# Patient Record
Sex: MALE | Race: WHITE | NOT HISPANIC OR LATINO | Employment: OTHER | URBAN - METROPOLITAN AREA
[De-identification: names, ages, dates, MRNs, and addresses within clinical notes are randomized per-mention and may not be internally consistent; named-entity substitution may affect disease eponyms.]

---

## 2020-10-01 ENCOUNTER — OFFICE VISIT (OUTPATIENT)
Dept: URBAN - METROPOLITAN AREA CLINIC 68 | Facility: CLINIC | Age: 75
End: 2020-10-01

## 2021-03-16 ENCOUNTER — OFFICE VISIT (OUTPATIENT)
Dept: URBAN - METROPOLITAN AREA CLINIC 68 | Facility: CLINIC | Age: 76
End: 2021-03-16

## 2021-04-14 ENCOUNTER — OFFICE VISIT (OUTPATIENT)
Dept: URBAN - METROPOLITAN AREA SURGERY CENTER 12 | Facility: SURGERY CENTER | Age: 76
End: 2021-04-14

## 2021-04-15 ENCOUNTER — LAB OUTSIDE AN ENCOUNTER (OUTPATIENT)
Dept: URBAN - METROPOLITAN AREA CLINIC 68 | Facility: CLINIC | Age: 76
End: 2021-04-15

## 2021-04-15 LAB — 01: (no result)

## 2021-04-19 ENCOUNTER — TELEPHONE ENCOUNTER (OUTPATIENT)
Dept: URBAN - METROPOLITAN AREA CLINIC 68 | Facility: CLINIC | Age: 76
End: 2021-04-19

## 2021-04-27 ENCOUNTER — OFFICE VISIT (OUTPATIENT)
Dept: URBAN - METROPOLITAN AREA CLINIC 68 | Facility: CLINIC | Age: 76
End: 2021-04-27

## 2022-06-04 ENCOUNTER — TELEPHONE ENCOUNTER (OUTPATIENT)
Dept: URBAN - METROPOLITAN AREA CLINIC 68 | Facility: CLINIC | Age: 77
End: 2022-06-04

## 2022-06-04 RX ORDER — PANTOPRAZOLE SODIUM 40 MG/1
TABLET, DELAYED RELEASE ORAL DAILY
Qty: 90 | Refills: 0 | OUTPATIENT
Start: 2018-01-23 | End: 2018-04-18

## 2022-06-04 RX ORDER — SODIUM SULFATE, POTASSIUM SULFATE, MAGNESIUM SULFATE 17.5; 3.13; 1.6 G/ML; G/ML; G/ML
SOLUTION, CONCENTRATE ORAL AS DIRECTED
Qty: 1 | Refills: 0 | OUTPATIENT
Start: 2021-03-16 | End: 2021-03-17

## 2022-06-04 RX ORDER — NEBIVOLOL HYDROCHLORIDE 10 MG/1
BYSTOLIC( 10MG ORAL  DAILY ) INACTIVE -HX ENTRY TABLET ORAL DAILY
OUTPATIENT
Start: 2018-01-23

## 2022-06-04 RX ORDER — AMPICILLIN SODIUM 2 G/1
INJECTION, POWDER, FOR SOLUTION INTRAMUSCULAR; INTRAVENOUS AS DIRECTED
Qty: 2 | Refills: 0 | OUTPATIENT
Start: 2014-01-07 | End: 2018-01-23

## 2022-06-05 ENCOUNTER — TELEPHONE ENCOUNTER (OUTPATIENT)
Dept: URBAN - METROPOLITAN AREA CLINIC 68 | Facility: CLINIC | Age: 77
End: 2022-06-05

## 2022-06-05 RX ORDER — PRAVASTATIN SODIUM 20 MG/1
PRAVASTATIN SODIUM( 20MG ORAL  DAILY ) ACTIVE -HX ENTRY TABLET ORAL DAILY
Status: ACTIVE | COMMUNITY
Start: 2021-04-27

## 2022-06-05 RX ORDER — LOSARTAN POTASSIUM 25 MG/1
LOSARTAN POTASSIUM( 25MG ORAL 1 DAILY ) ACTIVE -HX ENTRY TABLET ORAL DAILY
Status: ACTIVE | COMMUNITY
Start: 2021-04-27

## 2022-06-05 RX ORDER — ASCORBIC ACID 500 MG
VITAMIN C ER( 500MG ORAL  DDILY ) ACTIVE -HX ENTRY CAPSULE, EXTENDED RELEASE ORAL
Status: ACTIVE | COMMUNITY
Start: 2021-04-27

## 2022-06-05 RX ORDER — UBIDECARENONE/VIT E ACET 100MG-5
VITAMIN D( 2000UNIT ORAL  DAILY ) ACTIVE -HX ENTRY CAPSULE ORAL DAILY
Status: ACTIVE | COMMUNITY
Start: 2021-04-27

## 2022-06-05 RX ORDER — ELECTROLYTES/DEXTROSE
MULTIVITAMIN ADULT(  ORAL 1 DAILY ) ACTIVE -HX ENTRY SOLUTION, ORAL ORAL DAILY
Status: ACTIVE | COMMUNITY
Start: 2021-04-27

## 2022-06-05 RX ORDER — IPRATROPIUM BROMIDE 21 MCG
IPRATROPIUM BROMIDE( 0.03% NASAL  AS DIRECTED ) ACTIVE -HX ENTRY AEROSOL, SPRAY (ML) NASAL AS DIRECTED
Status: ACTIVE | COMMUNITY
Start: 2021-04-27

## 2022-06-25 ENCOUNTER — TELEPHONE ENCOUNTER (OUTPATIENT)
Age: 77
End: 2022-06-25

## 2022-06-25 RX ORDER — SODIUM SULFATE, POTASSIUM SULFATE, MAGNESIUM SULFATE 17.5; 3.13; 1.6 G/ML; G/ML; G/ML
SOLUTION, CONCENTRATE ORAL AS DIRECTED
Qty: 1 | Refills: 0 | OUTPATIENT
Start: 2021-03-16 | End: 2021-03-17

## 2022-06-25 RX ORDER — AMPICILLIN 2 G/1
INJECTION, POWDER, FOR SOLUTION INTRAMUSCULAR; INTRAVENOUS AS DIRECTED
Qty: 2 | Refills: 0 | OUTPATIENT
Start: 2014-01-07 | End: 2018-01-23

## 2022-06-25 RX ORDER — PANTOPRAZOLE 40 MG/1
TABLET, DELAYED RELEASE ORAL DAILY
Qty: 90 | Refills: 0 | OUTPATIENT
Start: 2018-01-23 | End: 2018-04-18

## 2022-06-25 RX ORDER — NEBIVOLOL HYDROCHLORIDE 10 MG/1
BYSTOLIC( 10MG ORAL  DAILY ) INACTIVE -HX ENTRY TABLET ORAL DAILY
OUTPATIENT
Start: 2018-01-23

## 2022-06-26 ENCOUNTER — TELEPHONE ENCOUNTER (OUTPATIENT)
Age: 77
End: 2022-06-26

## 2022-06-26 RX ORDER — UBIDECARENONE/VIT E ACET 100MG-5
VITAMIN D( 2000UNIT ORAL  DAILY ) ACTIVE -HX ENTRY CAPSULE ORAL DAILY
Status: ACTIVE | COMMUNITY
Start: 2021-04-27

## 2022-06-26 RX ORDER — ASPIRIN 81 MG/1
BABY ASPIRIN( 81MG ORAL  DAILY ) ACTIVE -HX ENTRY TABLET, COATED ORAL DAILY
Status: ACTIVE | COMMUNITY
Start: 2021-04-27

## 2022-06-26 RX ORDER — BECLOMETHASONE DIPROPIONATE HFA 80 UG/1
QVAR( 80MCG/ACT INHALATION  AS DIRECTED ) ACTIVE -HX ENTRY AEROSOL, METERED RESPIRATORY (INHALATION) AS DIRECTED
Status: ACTIVE | COMMUNITY
Start: 2021-04-27

## 2022-06-26 RX ORDER — LOSARTAN POTASSIUM 25 MG/1
LOSARTAN POTASSIUM( 25MG ORAL 1 DAILY ) ACTIVE -HX ENTRY TABLET, FILM COATED ORAL DAILY
Status: ACTIVE | COMMUNITY
Start: 2021-04-27

## 2022-06-26 RX ORDER — ASPIRIN 81 MG/1
LOW-DOSE ASPIRIN( 81MG ORAL  DAILY ) ACTIVE -HX ENTRY TABLET, COATED ORAL DAILY
Status: ACTIVE | COMMUNITY
Start: 2021-04-27

## 2022-06-26 RX ORDER — PRAVASTATIN SODIUM 20 MG/1
PRAVASTATIN SODIUM( 20MG ORAL  DAILY ) ACTIVE -HX ENTRY TABLET ORAL DAILY
Status: ACTIVE | COMMUNITY
Start: 2021-04-27

## 2022-06-26 RX ORDER — ASCORBIC ACID 500 MG
VITAMIN C ER( 500MG ORAL  DDILY ) ACTIVE -HX ENTRY CAPSULE, EXTENDED RELEASE ORAL
Status: ACTIVE | COMMUNITY
Start: 2021-04-27

## 2022-06-26 RX ORDER — ELECTROLYTES/DEXTROSE
MULTIVITAMIN ADULT(  ORAL 1 DAILY ) ACTIVE -HX ENTRY SOLUTION, ORAL ORAL DAILY
Status: ACTIVE | COMMUNITY
Start: 2021-04-27

## 2022-06-26 RX ORDER — IPRATROPIUM BROMIDE 21 UG/1
IPRATROPIUM BROMIDE( 0.03% NASAL  AS DIRECTED ) ACTIVE -HX ENTRY SPRAY NASAL AS DIRECTED
Status: ACTIVE | COMMUNITY
Start: 2021-04-27

## 2023-04-19 ENCOUNTER — OFFICE VISIT (OUTPATIENT)
Dept: URBAN - METROPOLITAN AREA CLINIC 68 | Facility: CLINIC | Age: 78
End: 2023-04-19

## 2023-04-19 RX ORDER — IPRATROPIUM BROMIDE 21 UG/1
IPRATROPIUM BROMIDE( 0.03% NASAL  AS DIRECTED ) ACTIVE -HX ENTRY SPRAY NASAL AS DIRECTED
Status: ACTIVE | COMMUNITY
Start: 2021-04-27

## 2023-04-19 RX ORDER — UBIDECARENONE/VIT E ACET 100MG-5
VITAMIN D( 2000UNIT ORAL  DAILY ) ACTIVE -HX ENTRY CAPSULE ORAL DAILY
Status: ACTIVE | COMMUNITY
Start: 2021-04-27

## 2023-04-19 RX ORDER — ASPIRIN 81 MG/1
BABY ASPIRIN( 81MG ORAL  DAILY ) ACTIVE -HX ENTRY TABLET, COATED ORAL DAILY
Status: ON HOLD | COMMUNITY
Start: 2021-04-27

## 2023-04-19 RX ORDER — BECLOMETHASONE DIPROPIONATE HFA 80 UG/1
QVAR( 80MCG/ACT INHALATION  AS DIRECTED ) ACTIVE -HX ENTRY AEROSOL, METERED RESPIRATORY (INHALATION) AS DIRECTED
Status: ACTIVE | COMMUNITY
Start: 2021-04-27

## 2023-04-19 RX ORDER — ASCORBIC ACID 500 MG
VITAMIN C ER( 500MG ORAL  DDILY ) ACTIVE -HX ENTRY CAPSULE, EXTENDED RELEASE ORAL
Status: ACTIVE | COMMUNITY
Start: 2021-04-27

## 2023-04-19 RX ORDER — LOSARTAN POTASSIUM 25 MG/1
LOSARTAN POTASSIUM( 25MG ORAL 1 DAILY ) ACTIVE -HX ENTRY TABLET, FILM COATED ORAL DAILY
Status: ACTIVE | COMMUNITY
Start: 2021-04-27

## 2023-04-19 RX ORDER — PRAVASTATIN SODIUM 20 MG/1
PRAVASTATIN SODIUM( 20MG ORAL  DAILY ) ACTIVE -HX ENTRY TABLET ORAL DAILY
Status: ACTIVE | COMMUNITY
Start: 2021-04-27

## 2023-04-19 RX ORDER — APIXABAN 5 MG/1
1 TABLET TABLET, FILM COATED ORAL TWICE A DAY
Status: ACTIVE | COMMUNITY

## 2023-04-19 NOTE — HPI-MIGRATED HPI
General : Patient evaluated due to colon polyps.  Referred is pending knee surgery that is scheduled for May 2023.  Denies nausea, vomits, dysphagia, odynophagia, heartburn, abdominal pain, diarrhea, constipation GI bleeding or weight loss

## 2023-11-01 ENCOUNTER — OFFICE VISIT (OUTPATIENT)
Dept: URBAN - METROPOLITAN AREA CLINIC 68 | Facility: CLINIC | Age: 78
End: 2023-11-01
Payer: MEDICARE

## 2023-11-01 VITALS
BODY MASS INDEX: 25.77 KG/M2 | WEIGHT: 180 LBS | DIASTOLIC BLOOD PRESSURE: 80 MMHG | SYSTOLIC BLOOD PRESSURE: 136 MMHG | HEIGHT: 70 IN

## 2023-11-01 DIAGNOSIS — K57.30 DIVERTICULOSIS OF COLON: ICD-10-CM

## 2023-11-01 DIAGNOSIS — Z86.010 HISTORY OF COLON POLYPS: ICD-10-CM

## 2023-11-01 DIAGNOSIS — K29.50 CHRONIC GASTRITIS WITHOUT BLEEDING, UNSPECIFIED GASTRITIS TYPE: ICD-10-CM

## 2023-11-01 DIAGNOSIS — R12 HEARTBURN: ICD-10-CM

## 2023-11-01 DIAGNOSIS — Z80.0 FAMILY HISTORY OF COLON CANCER: ICD-10-CM

## 2023-11-01 PROCEDURE — 99213 OFFICE O/P EST LOW 20 MIN: CPT | Performed by: INTERNAL MEDICINE

## 2023-11-01 RX ORDER — ASCORBIC ACID 500 MG
VITAMIN C ER( 500MG ORAL  DDILY ) ACTIVE -HX ENTRY CAPSULE, EXTENDED RELEASE ORAL
Status: ACTIVE | COMMUNITY
Start: 2021-04-27

## 2023-11-01 RX ORDER — PRAVASTATIN SODIUM 20 MG/1
PRAVASTATIN SODIUM( 20MG ORAL  DAILY ) ACTIVE -HX ENTRY TABLET ORAL DAILY
Status: ACTIVE | COMMUNITY
Start: 2021-04-27

## 2023-11-01 RX ORDER — IPRATROPIUM BROMIDE 21 UG/1
IPRATROPIUM BROMIDE( 0.03% NASAL  AS DIRECTED ) ACTIVE -HX ENTRY SPRAY NASAL AS DIRECTED
Status: ACTIVE | COMMUNITY
Start: 2021-04-27

## 2023-11-01 RX ORDER — SOD SULF/POT CHLORIDE/MAG SULF 1.479 G
12 TABLETS TABLET ORAL
Qty: 24 | Refills: 0 | OUTPATIENT
Start: 2023-11-01 | End: 2023-11-02

## 2023-11-01 RX ORDER — UBIDECARENONE/VIT E ACET 100MG-5
VITAMIN D( 2000UNIT ORAL  DAILY ) ACTIVE -HX ENTRY CAPSULE ORAL DAILY
Status: ACTIVE | COMMUNITY
Start: 2021-04-27

## 2023-11-01 RX ORDER — BECLOMETHASONE DIPROPIONATE HFA 80 UG/1
QVAR( 80MCG/ACT INHALATION  AS DIRECTED ) ACTIVE -HX ENTRY AEROSOL, METERED RESPIRATORY (INHALATION) AS DIRECTED
Status: ACTIVE | COMMUNITY
Start: 2021-04-27

## 2023-11-01 RX ORDER — LOSARTAN POTASSIUM 25 MG/1
LOSARTAN POTASSIUM( 25MG ORAL 1 DAILY ) ACTIVE -HX ENTRY TABLET, FILM COATED ORAL DAILY
Status: ACTIVE | COMMUNITY
Start: 2021-04-27

## 2023-11-01 RX ORDER — APIXABAN 5 MG/1
1 TABLET TABLET, FILM COATED ORAL TWICE A DAY
Status: ACTIVE | COMMUNITY

## 2023-11-01 RX ORDER — ASPIRIN 81 MG/1
BABY ASPIRIN( 81MG ORAL  DAILY ) ACTIVE -HX ENTRY TABLET, COATED ORAL DAILY
Status: ON HOLD | COMMUNITY
Start: 2021-04-27

## 2023-11-01 NOTE — HPI-TODAY'S VISIT:
Patient evluated due to colon polyps. Denies nausea, vomits, dysphagia, odynophagia, heartburn, abdominal pain, diarrhea, constipation GI bleeding or weight loss

## 2023-11-01 NOTE — PHYSICAL EXAM HENT:
Head, normocephalic, atraumatic, Face, Face within normal limits, Ears, External ears within normal limits, Nose/Nasopharynx, External nose normal appearance, nares patent, no nasal discharge, Mouth and Throat, Oral cavity appearance normal, Lips, Appearance normal Pt informed of the interaction and to keep a close eye on the INR. Pt aware to inform the coumadin clinic and ask when he should get his INR checked with starting the new med.

## 2023-11-02 ENCOUNTER — OFFICE VISIT (OUTPATIENT)
Dept: URBAN - METROPOLITAN AREA CLINIC 68 | Facility: CLINIC | Age: 78
End: 2023-11-02

## 2023-11-08 ENCOUNTER — LAB OUTSIDE AN ENCOUNTER (OUTPATIENT)
Dept: URBAN - METROPOLITAN AREA CLINIC 68 | Facility: CLINIC | Age: 78
End: 2023-11-08

## 2023-12-18 ENCOUNTER — DASHBOARD ENCOUNTERS (OUTPATIENT)
Age: 78
End: 2023-12-18

## 2023-12-18 ENCOUNTER — OUT OF OFFICE VISIT (OUTPATIENT)
Dept: URBAN - METROPOLITAN AREA SURGERY CENTER 12 | Facility: SURGERY CENTER | Age: 78
End: 2023-12-18
Payer: MEDICARE

## 2023-12-18 ENCOUNTER — CLAIMS CREATED FROM THE CLAIM WINDOW (OUTPATIENT)
Dept: URBAN - METROPOLITAN AREA CLINIC 4 | Facility: CLINIC | Age: 78
End: 2023-12-18
Payer: MEDICARE

## 2023-12-18 DIAGNOSIS — K57.30 DIVERTICULOSIS OF LARGE INTESTINE WITHOUT PERFORATION OR ABSCESS WITHOUT BLEEDING: ICD-10-CM

## 2023-12-18 DIAGNOSIS — K63.5 POLYP OF TRANSVERSE COLON, UNSPECIFIED TYPE: ICD-10-CM

## 2023-12-18 DIAGNOSIS — K64.8 OTHER HEMORRHOIDS: ICD-10-CM

## 2023-12-18 DIAGNOSIS — Z86.010 PERSONAL HISTORY OF COLONIC POLYPS: ICD-10-CM

## 2023-12-18 DIAGNOSIS — Z86.010 ADENOMAS PERSONAL HISTORY OF COLONIC POLYPS: ICD-10-CM

## 2023-12-18 DIAGNOSIS — K63.5 BENIGN COLON POLYPS: ICD-10-CM

## 2023-12-18 DIAGNOSIS — D12.3 BENIGN NEOPLASM OF TRANSVERSE COLON: ICD-10-CM

## 2023-12-18 PROCEDURE — 45385 COLONOSCOPY W/LESION REMOVAL: CPT | Performed by: INTERNAL MEDICINE

## 2023-12-18 PROCEDURE — 88305 TISSUE EXAM BY PATHOLOGIST: CPT | Performed by: PATHOLOGY

## 2023-12-18 PROCEDURE — 00811 ANES LWR INTST NDSC NOS: CPT | Performed by: NURSE ANESTHETIST, CERTIFIED REGISTERED

## 2023-12-18 RX ORDER — BECLOMETHASONE DIPROPIONATE HFA 80 UG/1
QVAR( 80MCG/ACT INHALATION  AS DIRECTED ) ACTIVE -HX ENTRY AEROSOL, METERED RESPIRATORY (INHALATION) AS DIRECTED
Status: ACTIVE | COMMUNITY
Start: 2021-04-27

## 2023-12-18 RX ORDER — ASCORBIC ACID 500 MG
VITAMIN C ER( 500MG ORAL  DDILY ) ACTIVE -HX ENTRY CAPSULE, EXTENDED RELEASE ORAL
Status: ACTIVE | COMMUNITY
Start: 2021-04-27

## 2023-12-18 RX ORDER — PRAVASTATIN SODIUM 20 MG/1
PRAVASTATIN SODIUM( 20MG ORAL  DAILY ) ACTIVE -HX ENTRY TABLET ORAL DAILY
Status: ACTIVE | COMMUNITY
Start: 2021-04-27

## 2023-12-18 RX ORDER — APIXABAN 5 MG/1
1 TABLET TABLET, FILM COATED ORAL TWICE A DAY
Status: ACTIVE | COMMUNITY

## 2023-12-18 RX ORDER — ASPIRIN 81 MG/1
BABY ASPIRIN( 81MG ORAL  DAILY ) ACTIVE -HX ENTRY TABLET, COATED ORAL DAILY
Status: ON HOLD | COMMUNITY
Start: 2021-04-27

## 2023-12-18 RX ORDER — LOSARTAN POTASSIUM 25 MG/1
LOSARTAN POTASSIUM( 25MG ORAL 1 DAILY ) ACTIVE -HX ENTRY TABLET, FILM COATED ORAL DAILY
Status: ACTIVE | COMMUNITY
Start: 2021-04-27

## 2023-12-18 RX ORDER — IPRATROPIUM BROMIDE 21 UG/1
IPRATROPIUM BROMIDE( 0.03% NASAL  AS DIRECTED ) ACTIVE -HX ENTRY SPRAY NASAL AS DIRECTED
Status: ACTIVE | COMMUNITY
Start: 2021-04-27

## 2023-12-18 RX ORDER — UBIDECARENONE/VIT E ACET 100MG-5
VITAMIN D( 2000UNIT ORAL  DAILY ) ACTIVE -HX ENTRY CAPSULE ORAL DAILY
Status: ACTIVE | COMMUNITY
Start: 2021-04-27

## 2024-01-10 ENCOUNTER — TELEPHONE ENCOUNTER (OUTPATIENT)
Dept: URBAN - METROPOLITAN AREA CLINIC 68 | Facility: CLINIC | Age: 79
End: 2024-01-10